# Patient Record
Sex: MALE | Race: WHITE | NOT HISPANIC OR LATINO | Employment: OTHER | URBAN - METROPOLITAN AREA
[De-identification: names, ages, dates, MRNs, and addresses within clinical notes are randomized per-mention and may not be internally consistent; named-entity substitution may affect disease eponyms.]

---

## 2023-01-01 ENCOUNTER — DOCUMENTATION ONLY (OUTPATIENT)
Dept: TRANSPLANT | Facility: CLINIC | Age: 68
End: 2023-01-01
Payer: MEDICARE

## 2023-01-01 ENCOUNTER — TRANSCRIBE ORDERS (OUTPATIENT)
Dept: OTHER | Age: 68
End: 2023-01-01

## 2023-01-01 ENCOUNTER — PRE VISIT (OUTPATIENT)
Dept: GASTROENTEROLOGY | Facility: CLINIC | Age: 68
End: 2023-01-01
Payer: MEDICARE

## 2023-01-01 ENCOUNTER — HEALTH MAINTENANCE LETTER (OUTPATIENT)
Age: 68
End: 2023-01-01

## 2023-01-01 ENCOUNTER — REFERRAL (OUTPATIENT)
Dept: TRANSPLANT | Facility: CLINIC | Age: 68
End: 2023-01-01

## 2023-01-01 ENCOUNTER — VIRTUAL VISIT (OUTPATIENT)
Dept: GASTROENTEROLOGY | Facility: CLINIC | Age: 68
End: 2023-01-01
Attending: PHYSICIAN ASSISTANT
Payer: MEDICARE

## 2023-01-01 VITALS — WEIGHT: 200 LBS | HEIGHT: 68 IN | BODY MASS INDEX: 30.31 KG/M2

## 2023-01-01 VITALS — WEIGHT: 203 LBS | HEIGHT: 68 IN | BODY MASS INDEX: 30.77 KG/M2

## 2023-01-01 DIAGNOSIS — K76.6 PORTAL HYPERTENSION (H): ICD-10-CM

## 2023-01-01 DIAGNOSIS — K75.81 LIVER CIRRHOSIS SECONDARY TO NASH (H): Primary | Chronic | ICD-10-CM

## 2023-01-01 DIAGNOSIS — K74.60 LIVER CIRRHOSIS SECONDARY TO NASH (H): Primary | ICD-10-CM

## 2023-01-01 DIAGNOSIS — K74.60 LIVER CIRRHOSIS SECONDARY TO NASH (H): Primary | Chronic | ICD-10-CM

## 2023-01-01 DIAGNOSIS — R18.8 CIRRHOSIS OF LIVER WITH ASCITES, UNSPECIFIED HEPATIC CIRRHOSIS TYPE (H): Primary | ICD-10-CM

## 2023-01-01 DIAGNOSIS — K75.81 LIVER CIRRHOSIS SECONDARY TO NASH (H): Primary | ICD-10-CM

## 2023-01-01 DIAGNOSIS — R94.39 ABNORMAL RESULT OF OTHER CARDIOVASCULAR FUNCTION STUDY: ICD-10-CM

## 2023-01-01 DIAGNOSIS — K74.60 CIRRHOSIS OF LIVER WITH ASCITES, UNSPECIFIED HEPATIC CIRRHOSIS TYPE (H): Primary | ICD-10-CM

## 2023-01-01 PROCEDURE — 99205 OFFICE O/P NEW HI 60 MIN: CPT | Mod: VID | Performed by: INTERNAL MEDICINE

## 2023-01-01 PROCEDURE — 99417 PROLNG OP E/M EACH 15 MIN: CPT | Performed by: INTERNAL MEDICINE

## 2023-01-01 RX ORDER — LACTULOSE 10 G/15ML
30 SOLUTION ORAL DAILY
COMMUNITY
Start: 2023-01-01

## 2023-01-01 RX ORDER — LEVALBUTEROL TARTRATE 45 UG/1
2 AEROSOL, METERED ORAL PRN
COMMUNITY
Start: 2023-01-01

## 2023-01-01 RX ORDER — FLUTICASONE PROPIONATE AND SALMETEROL 250; 50 UG/1; UG/1
1 POWDER RESPIRATORY (INHALATION) 2 TIMES DAILY
COMMUNITY
Start: 2023-01-01

## 2023-01-01 RX ORDER — MIDODRINE HYDROCHLORIDE 5 MG/1
5 TABLET ORAL 3 TIMES DAILY
COMMUNITY
Start: 2023-01-01

## 2023-01-01 RX ORDER — SPIRONOLACTONE 100 MG/1
150 TABLET, FILM COATED ORAL DAILY
COMMUNITY
Start: 2023-01-01

## 2023-01-01 RX ORDER — FINASTERIDE 5 MG/1
5 TABLET, FILM COATED ORAL DAILY
COMMUNITY
Start: 2023-01-01

## 2023-01-01 RX ORDER — FUROSEMIDE 80 MG
1 TABLET ORAL DAILY
COMMUNITY
Start: 2023-01-01

## 2023-01-01 RX ORDER — TAMSULOSIN HYDROCHLORIDE 0.4 MG/1
0.4 CAPSULE ORAL DAILY
COMMUNITY
Start: 2023-01-01

## 2023-01-01 RX ORDER — ATORVASTATIN CALCIUM 10 MG/1
5 TABLET, FILM COATED ORAL DAILY
COMMUNITY
Start: 2023-01-01

## 2023-01-01 ASSESSMENT — PAIN SCALES - GENERAL: PAINLEVEL: NO PAIN (0)

## 2023-05-22 NOTE — CONFIDENTIAL NOTE
DIAGNOSIS: Cirrhosis of liver with ascites, unspecified hepatic cirrhosis type (H)   Appt Date: 07.07.2023   NOTES STATUS DETAILS   OFFICE NOTE from referring provider Care Everywhere 04.27.2023 Severson, Stephanie L, PA-C [facility]    OFFICE NOTES from other specialists Care Everywhere 04.10.2023 Didier Tabares, APRN, CNP [facility]    12.27.2022 Aleksander Nix MD     DISCHARGE SUMMARY from hospital Care Everywhere 04.26.2023, 03.14.2023 [facility]   MEDICATION LIST Care Everywhere    LIVER BIOSPY (IF APPLICABLE)      PATHOLOGY REPORTS      IMAGING     ENDOSCOPY (IF AVAILABLE)     COLONOSCOPY (IF AVAILABLE) Care Everywhere 05.03.2023   ULTRASOUND LIVER Care Everywhere 05.05.2023,  04.26.2023 US Paracentesis    04.26.2023 US Hepatic Portal    CT OF ABDOMEN     MRI OF LIVER CareEverywhere 03.15.2023 MR Abd    FIBROSCAN, US ELASTOGRAPHY, FIBROSIS SCAN, MR ELASTOGRAPHY     LABS     HEPATIC PANEL (LIVER PANEL) Care Everywhere 04.28.2023   BASIC METABOLIC PANEL Care Everywhere 05.07.2023   COMPLETE METABOLIC PANEL     COMPLETE BLOOD COUNT (CBC)     INTERNATIONAL NORMALIZED RATIO (INR) Care Everywhere 05.03.2023   HEPATITIS C ANTIBODY Care Everywhere 09.23.2021   HEPATITIS C VIRAL LOAD/PCR          HEPATITIS B SURFACE ANTIGEN Care Everywhere 09.23.2021   HEPATITIS B SURFACE ANTIBODY Care Everywhere 09.23.2021   HEPATITIS B DNA QUANT LEVEL     HEPATITIS B CORE ANTIBODY Care Everywhere 09.23.2021     Action 05.22.2023 RM   Action Taken Sent fax request to [facility] for images pending.     Action 07.03.2023 RM   Action Taken Sent another fax request for images     Action 07.03.2023 RM   Action Taken Images received and uploaded to chart

## 2023-07-06 NOTE — PROGRESS NOTES
REASON FOR CONSULTATION: TEAGUE cirrhosis with decompensation  REFERRING PROVIDER: Sherron Waller NP [facility]    A/P  Claus Herrera is a 68 year old male with decompensated TEAGUE cirrhosis, c/b ascites, HRS, HE, MELD mid teens but labs ar eout of date.    He has had difficulty in the last 3 months with admissions x 2  And subseqent decline in his overall functionality. Main issue he is dealing with right now is improving strength.    Ascites has been problematic but seems to be improving, with para only monthly at this point. Last labs are from about 3-4 weeks ago and INR is from several months ago. He will be getting labs on Monday. I will review orders and add if necessary.    PVT noted (nonocclusive) in April. He is on Eliquis. Will repeat US in August and can likely stop Eliquis after that.    HypoTN when admitted 2 mo ago. On midodrine. He is monitoring BP at home. Excellent BP. Will review with him in person and plan to wean off    HRS Creat appears to have returned to normal baseline. Lab value is >3 weeks old. Will repeat next week.    Pending the results of the labs, I will plan to see him while I am in [place] in July or August.     We discussed cirrhosis, sequelae, MELD, LT at length.      She Greco MD  Hepatology/Liver Transplant  Medical Director, Liver Transplantation  [facility]    Subjective  Claus Herrera is a 68 year old male referred for decompensated TEAGUE cirrhosis. He was diagnosed with liver disease in 2021 when he had abnormal LFTs and he followed with Sherron Waller NP [facility]..    He did ok for a while, then March 2023 he was admitted with 50 pound weight gain.    Admissions   2/14-3/18/23  ascites, EGD with bands x3, JESUS creat up to 2.2  4/26-5/8/23 HRS, hypoTn, HE, new PVT started on Eliquis (CT 4/26/23 nonocclusive thrombus in main PV extending to R and L PV. MRI 3/16/23 all PV patent). Discharged to SNF    Was in SNF for about 3 weeks. Now has been  "home for about 6 weeks. He is independent in ADLs. He can climb a flight stairs without assistance. They are moving to a single level home, currently in a 3 level home. He is still getting PT and they are thinking he may no longer need it. He uses a walker in the house and a wheelchair when he is at doctor's appointments. Last time he did not need a walker was March.    HE on lactulose with 3-5 BM/d, rifaximin. No issues at home  Ascites furosemide 80 spironolactone 150. Getting para about monthly   PVT on apixaban. Will have US in August.  HypoTn On midodrine 5 mg tid. Typical BP is 120s/60-70s  EV s/p banding. Last EGD 5/3/23 with banding scars    ETOH use: none for several years.    Meds  Apixaban  Furosemide 80  Lactulose  Rifaximin  Spironolactone 150  Omeprazole  Midodrine 5 tid  Atorvastatin  Finesteride  Tamsulosin  Advair  Xopenex    Labs 6/15/23 MELD (estimate) 18  Creat 0.89 Na 138 K 3.3   AST 52 ALT 28 TB 2  Hgb 11.8 Plt 153  No INR for a few months but usually was in 1.8-2 range in the past    Past Medical History  DM diagnosed about 2018 off medications   Obesity has lost about 40-50 pounds over the last year. Now 203 lb  Asthma uses Advair bid. Rescue inhaler rare use.  BPH/Urinary retention on Proscar and Flomax. See urology regularly  HLD on statin  Social History    Last worked at [employer]. Retired age 62 because \"it was time to retire.\"  Family History  M cirrhosis.  in her 60s  3 children, 6 grandkids  ROS: 10 point ROS neg other than the symptoms noted above in the HPI.  Exam  Gen Alert pleasant NAD  Resp No difficulty breathing. No cough  Skin No Jaundice  Eyes No icterus  Neuro VIVEROS  MSK no muscle wasting  Psyche Pleasant, appropriate. Well groomed.  Claus Herrera is a 68 year old male who is being evaluated via a billable video visit.    Video-Visit Details  Type of service:  Video Visit  Video Start Time: 1040  Video End Time: 1120  Originating Location (pt. Location):in " car with wife  Distant Location (provider location):  [facility] [place]      Platform used for Video Visit: AirC[employer] or [employer]    Time today in minutes 105  Record review 35  Communication with care team 5  Face to face with patient on video 40  Documentation 25

## 2023-07-07 NOTE — NURSING NOTE
Is the patient currently in the state of [place]? YES    Visit mode:VIDEO    If the visit is dropped, the patient can be reconnected by: VIDEO VISIT: Send to e-mail at: [email or web address]    Will anyone else be joining the visit? YES: How would they like to receive their invitation? Other e-mail: [email or web address]      How would you like to obtain your AVS? Mail a copy    Are changes needed to the allergy or medication list? NO    Reason for visit: Consult

## 2023-07-07 NOTE — PROGRESS NOTES
"Virtual Visit Details    Type of service:  Video Visit   Video Start Time: {video visit start/end time for provider to select:740100}  Video End Time:{video visit start/end time for provider to select:262816}    Originating Location (pt. Location): {video visit patient location:205212::\"Home\"}  {PROVIDER LOCATION On-site should be selected for visits conducted from your clinic location or adjoining [facility] hospital, academic office, or other nearby [facility] building. Off-site should be selected for all other provider locations, including home:997342}  Distant Location (provider location):  {virtual location provider:030432}  Platform used for Video Visit: {Virtual Visit Platforms:103561::\"MyEnergy\"}  "

## 2023-08-18 PROBLEM — I85.10 SECONDARY ESOPHAGEAL VARICES WITHOUT BLEEDING (H): Chronic | Status: ACTIVE | Noted: 2022-02-16

## 2023-08-18 PROBLEM — K75.81 LIVER CIRRHOSIS SECONDARY TO NASH (H): Chronic | Status: ACTIVE | Noted: 2021-11-18

## 2023-08-18 PROBLEM — R39.12 BENIGN PROSTATIC HYPERPLASIA WITH WEAK URINARY STREAM: Chronic | Status: ACTIVE | Noted: 2023-01-01

## 2023-08-18 PROBLEM — N40.1 BENIGN PROSTATIC HYPERPLASIA WITH WEAK URINARY STREAM: Chronic | Status: ACTIVE | Noted: 2023-01-01

## 2023-08-18 PROBLEM — K74.60 LIVER CIRRHOSIS SECONDARY TO NASH (H): Chronic | Status: ACTIVE | Noted: 2021-11-18

## 2023-08-18 PROBLEM — K70.31 ASCITES DUE TO ALCOHOLIC CIRRHOSIS (H): Chronic | Status: ACTIVE | Noted: 2023-01-01

## 2023-08-18 PROBLEM — D18.03 HEPATIC HEMANGIOMA: Chronic | Status: ACTIVE | Noted: 2023-01-01

## 2023-08-18 PROBLEM — I82.0 BUDD-CHIARI SYNDROME (H): Status: ACTIVE | Noted: 2023-01-01

## 2023-08-18 PROBLEM — K26.9 DUODENAL ULCER: Status: ACTIVE | Noted: 2023-01-01

## 2023-08-18 PROBLEM — D12.6 TUBULAR ADENOMA OF COLON: Chronic | Status: ACTIVE | Noted: 2021-10-08

## 2023-08-18 NOTE — TELEPHONE ENCOUNTER
Patient was asked the following questions during liver intake call.     Referring Provider: CONSTANTINO Stapleton  Referring Diagnosis: TEAGUE  PCP:  Didier Tabares NP     1)Do you know why you have liver disease: yes             If Alcoholic Cirrhosis is present when was your last drink: 1995             Have you ever been through treatment for alcohol: on his own.  2) Presence of Ascites: yes Paracentesis: yes  3) Presence of Hepatic Encephalopathy: yes Medications: yes  4) History of GI Bleeding: no  5) Oxygen Use: no  6) EGD: yes  7) Colonoscopy: yes   8) MELD Score: 19   9) Labs available for review from PCP/GI: yes  10)HCC Diagnosis: no          Abdominal CT: yes          MRI:yes          Bone Scan: no          PET: no          Chest CT: yes          Treatment Notes w/ Images: yes          11)Insurance information:                  [facility]   Medicare     Referral intake process completed.  Patient is aware that after financial approval is received, medical records will be requested.   Patient confirmed for a callback from transplant coordinator on 8/31/23 at 11:00 AM .  Tentative evaluation date TBD    Confirmed coordinator will discuss evaluation process in more detail at the time of their call.   Patient is aware of the need to arrange age appropriate cancer screening, vaccinations, and dental care.  Reminded patient to complete questionnaire, complete medical records release, and review packet prior to evaluation visit .  Assessed patient for special needs (ie--wheelchair, assistance, guardian, and ):  wheelchair   Patient instructed to call [phone] with questions.     Patient gave verbal consent during intake call to obtain medical records and documents outside of [facility]:  yes

## 2023-08-18 NOTE — PROGRESS NOTES
8/16/23 [facility] note:    She Greco MD - 08/16/2023 1:30 PM CDT  Formatting of this note is different from the original.  [facility] LIVER TRANPLANT EVALUATION    REASON FOR CONSULTATION: TEAGUE cirrhosis with decompensation  REFERRING PROVIDER: Sherron Waller NP [facility]    A/P  Claus Herrera is a 68 year old male with decompensated TEAGUE cirrhosis, c/b ascites, HRS, HE, MELD 3.0 19 ABO A    He has had difficulty in the last 6 months with admissions x 2 And subseqent decline in his overall functionality. His MELD has been in mid-high teens. Today we reviewed his course, labs and need for LT evaluation.    Ascites Requiring LVP monthly, will move up to every 3 weeks. On furosemide 80 and spironolactone 150. No increase due to cramping, borderline labs and JEUSS in February.    PVT noted (nonocclusive) in April. He is on Eliquis. Repeat US 8/9/23 and all vessels are patent. Can stop Eliquis. Recheck in 3-4 m.    HypoTN when admitted 2 mo ago. On midodrine. He is monitoring BP at home. Excellent BP. Wean off midodrine from 5 tid, to 5 bid for 1 week, then daily for 1 week, then stop. Check BP daily and let me know if he is below 90/50 or if he has dizziness.    HCC screening US 8/7/23 no mass. Will repeat in 4 mo due to PVT.    Cramping Discussed tonic water, magnesium, zinc and vitamin E.    Dental Health Very good. Slightly overdue. Ok to proceed with routine cleaning. Discussed dental health with respect to cirrhosis    HRS Creat returned to normal baseline.    CV Echo 4/26/23 EF 70. No valvular dysfunction, No RV enlargement    Malnutrition severe. Discussed protein and MVI  Functional status Borderline, but he has improved since his last admission and is independent in ADLS.  Social support Excellent with wife Augustina her today  LT Candidacy Will refer for LT evaluation, ideally in the next 1-2 months.     Subjective  Claus Herrera is a 68 year old male referred for decompensated  TEAGUE cirrhosis. He was diagnosed with liver disease in 2021 when he had abnormal LFTs and he followed with Sherron Waller NP [facility]. MELD has been in mid-teens    He did ok for a while, then March 2023 he was admitted with 50 pound weight gain. He continues with monthly para, getting about 10-11 L off. He does not go more frequently because of scheduling availability. He will be looking at scheduling every 3 weeks. He has continued to work on getting stronger and no longer has PT    Was in SNF for about 3 weeks in [place]. Now has been home for about 3 months. He can climb a flight stairs without assistance. They just moved to a single level home midJuly. Last PT was early July. He uses a walker in the house and a wheelchair when he is at doctor's appointments. Last time he did not need a walker was March 2023. Sometimes he needs help with dressing. He performs toileting independently. He requires help with bathing. No falls.    Admissions  2/14-3/18/23 ascites, EGD with bands x3, JESUS creat up to 2.2  4/26-5/8/23 HRS, hypoTn, HE, new PVT started on Eliquis (CT 4/26/23 nonocclusive thrombus in main PV extending to R and L PV. MRI 3/16/23 all PV patent). Discharged to SNF    HE on lactulose with 3-5 BM/d, rifaximin. No issues at home  Ascites furosemide 80 spironolactone 150. Getting para about monthly with 10 L. Follows low Na diet.  PVT on apixaban.  HypoTn On midodrine 5 mg tid. Typical BP is 120s/60-70s.  EV s/p banding. Last EGD 5/3/23 with banding scars.    ETOH use: none for several years.    Meds  Apixaban  Furosemide 80  Lactulose  Rifaximin  Spironolactone 150  Omeprazole  Midodrine 5 tid  Atorvastatin  Finesteride  Tamsulosin  Advair  Xopenex    Labs 8/16/23 MELD 3.0 19  Creat 1.06 Na 134 K 3.8   AST 64 ALT 28 TB 1.7 Albumin 2.4  Hgb 11.2 Plt 154  INR 1.9    MELD-Na score: 19 at 8/16/2023 1:07 PM  MELD score: 17 at 8/16/2023 1:07 PM  Calculated from:  Serum Creatinine: 1.06 mg/dL at 8/16/2023  "1:07 PM  Serum Sodium: 134 mEq/L at 2023 1:07 PM  Total Bilirubin: 1.7 mg/dL at 2023 1:07 PM  INR(ratio): 1.9 at 2023 1:07 PM  Age: 68 years    Labs 6/15/23 MELD (estimate) 18  Creat 0.89 Na 138 K 3.3   AST 52 ALT 28 TB 2  Hgb 11.8 Plt 153  No INR for a few months but usually was in 1.8-2 range in the past    Past Medical History  DM diagnosed about 2018 off medications  Obesity has lost about 40-50 pounds over the last year. Now 203 lb  Asthma uses Advair bid. Rescue inhaler rare use.  BPH/Urinary retention on Proscar and Flomax. See urology regularly  HLD on statin  Social History    Last worked at [employer]. Retired age 62 because \"it was time to retire.\"  Family History  M cirrhosis.  in her 60s  3 children, 6 grandkids  ROS: 10 point ROS neg other than the symptoms noted above in the HPI.  Exam  Vitals:  23 1322  Height: 5' 7\" (1.702 m)  Weight: 219 lb (99.3 kg)  BMI (Calculated): 34.3    Gen Alert pleasant NAD  Resp No difficulty breathing. No cough  Skin No Jaundice  Eyes No icterus  Neuro VIVEROS  MSK no muscle wasting  Psyche Pleasant, appropriate. Well groomed.    Electronically signed by She Greco MD at 2023 7:44 PM CDT           23 [facility] clinic note:    REASON FOR CONSULTATION: TEAGUE cirrhosis with decompensation  REFERRING PROVIDER: [facility]     A/P  Claus FRANCISCO Herrera is a 68 year old male with decompensated TEAGUE cirrhosis, c/b ascites, HRS, HE, MELD mid teens but labs ar eout of date.     He has had difficulty in the last 3 months with admissions x 2  And subseqent decline in his overall functionality. Main issue he is dealing with right now is improving strength.     Ascites has been problematic but seems to be improving, with para only monthly at this point. Last labs are from about 3-4 weeks ago and INR is from several months ago. He will be getting labs on Monday. I will review orders and add if necessary.     PVT noted " (nonocclusive) in April. He is on Eliquis. Will repeat US in August and can likely stop Eliquis after that.     HypoTN when admitted 2 mo ago. On midodrine. He is monitoring BP at home. Excellent BP. Will review with him in person and plan to wean off     HRS Creat appears to have returned to normal baseline. Lab value is >3 weeks old. Will repeat next week.     Pending the results of the labs, I will plan to see him while I am in [place] in July or August.      We discussed cirrhosis, sequelae, MELD, LT at length.        She Greco MD  Hepatology/Liver Transplant  Medical Director, Liver Transplantation  [facility]    Subjective  Claus Herrera is a 68 year old male referred for decompensated TEAGUE cirrhosis. He was diagnosed with liver disease in 2021 when he had abnormal LFTs and he followed with Sherron Waller NP [facility]     He did ok for a while, then March 2023 he was admitted with 50 pound weight gain.     Admissions   2/14-3/18/23  ascites, EGD with bands x3, JESUS creat up to 2.2  4/26-5/8/23 HRS, hypoTn, HE, new PVT started on Eliquis (CT 4/26/23 nonocclusive thrombus in main PV extending to R and L PV. MRI 3/16/23 all PV patent). Discharged to SNF     Was in SNF for about 3 weeks. Now has been home for about 6 weeks. He is independent in ADLs. He can climb a flight stairs without assistance. They are moving to a single level home, currently in a 3 level home. He is still getting PT and they are thinking he may no longer need it. He uses a walker in the house and a wheelchair when he is at doctor's appointments. Last time he did not need a walker was March.     HE on lactulose with 3-5 BM/d, rifaximin. No issues at home  Ascites furosemide 80 spironolactone 150. Getting para about monthly   PVT on apixaban. Will have US in August.  HypoTn On midodrine 5 mg tid. Typical BP is 120s/60-70s  EV s/p banding. Last EGD 5/3/23 with banding scars     ETOH use: none for several years.    "  Meds  Apixaban  Furosemide 80  Lactulose  Rifaximin  Spironolactone 150  Omeprazole  Midodrine 5 tid  Atorvastatin  Finesteride  Tamsulosin  Advair  Xopenex     Labs 6/15/23 MELD (estimate) 18  Creat 0.89 Na 138 K 3.3   AST 52 ALT 28 TB 2  Hgb 11.8 Plt 153  No INR for a few months but usually was in 1.8-2 range in the past     Past Medical History  DM diagnosed about  off medications   Obesity has lost about 40-50 pounds over the last year. Now 203 lb  Asthma uses Advair bid. Rescue inhaler rare use.  BPH/Urinary retention on Proscar and Flomax. See urology regularly  HLD on statin  Social History    Last worked at [employer]. Retired age 62 because \"it was time to retire.\"  Family History  M cirrhosis.  in her 60s  3 children, 6 grandkids  ROS: 10 point ROS neg other than the symptoms noted above in the HPI.  Exam  Gen Alert pleasant NAD  Resp No difficulty breathing. No cough  Skin No Jaundice  Eyes No icterus  Neuro VIVEROS  MSK no muscle wasting  Psyche Pleasant, appropriate. Well groomed.  Claus Herrera is a 68 year old male who is being evaluated via a billable video visit.    Video-Visit Details  Type of service:  Video Visit  Video Start Time: 1040  Video End Time: 1120  Originating Location (pt. Location):in car with wife  Distant Location (provider location):  [facility] [place]      "

## 2023-08-18 NOTE — LETTER
August 18, 2023    Claus Herrera  [address] [facility] [place] [address]    Dear Claus,    Thank you for your interest in the Transplant Center at [facility]. We look forward to being a part of your care team and assisting you through the transplant process.    As we discussed, your transplant coordinator is Sami Lopez Jr. (Liver).  You may call your coordinator at any time with questions or concerns.  Your first scheduled call will be on 8/31/23 at 11:00 AM.  If this needs to change, call [phone].    Please complete the following.    Fill out and return the enclosed forms  Authorization for Electronic Communication  Authorization for Release of Protected Health Information    Sign up for:  [employer], access to your electronic medical record (see enclosed pamphlet)  Hipscan, a transplant education website  Pre-Liver Transplant (English): [email or web address].CloudPay.net/playlist?list=[email or web address]   Transplant Medication Training (English): https://www.Workshare.CloudPay.net/playlist?list=[email or web address]-[email or web address]  Transplant Admission Training (English): https://www.Workshare.CloudPay.net/[place] Interactivelist?list=[email or web address]  Post-Transplant Complications (All Organs) (English): https://www.Workshare.CloudPay.net/playlist?list=[email or web address]  Post-Transplant General Health (All Organs) (English): https://www.Workshare.CloudPay.net/playlist?list=[email or web address]  General Patient Learning Modules (Enoxaparin, Warfarin, Olivares) (English): https://www.Workshare.CloudPay.net/[place] Interactivelist?list=[email or web address]      You can use these tools to learn more about your transplant, communicate with your care team, and track your medical details      Sincerely,      Solid Organ Transplant  [facility]    cc: Referring Physician PCP

## 2023-08-31 NOTE — TELEPHONE ENCOUNTER
"Referring Provider: CONSTANTINO Stapleton  Referring Diagnosis: TEAGUE  PCP:  Didier Tabares, JONATHAN     8/16/23 visit with Dr. She Greco in [place] at [facility], note below:    \"A/P  Claus Herrera is a 68 year old male with decompensated TEAGUE cirrhosis, c/b ascites, HRS, HE, MELD 3.0 19 ABO A    He has had difficulty in the last 6 months with admissions x 2 And subseqent decline in his overall functionality. His MELD has been in mid-high teens. Today we reviewed his course, labs and need for LT evaluation.    Ascites Requiring LVP monthly, will move up to every 3 weeks. On furosemide 80 and spironolactone 150. No increase due to cramping, borderline labs and JESUS in February.     PVT noted (nonocclusive) in April. He is on Eliquis. Repeat US 8/9/23 and all vessels are patent. Can stop Eliquis. Recheck in 3-4 m.    HypoTN when admitted 2 mo ago. On midodrine. He is monitoring BP at home. Excellent BP. Wean off midodrine from 5 tid, to 5 bid for 1 week, then daily for 1 week, then stop. Check BP daily and let me know if he is below 90/50 or if he has dizziness.     HCC screening US 8/7/23 no mass. Will repeat in 4 mo due to PVT.    Cramping Discussed tonic water, magnesium, zinc and vitamin E.  Dental Health Very good. Slightly overdue. Ok to proceed with routine cleaning. 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

## 2023-09-14 NOTE — TELEPHONE ENCOUNTER
"Just spoke with patient who wants more time to think on if he wants to proceed or not proceed with transplant.    Patient wants time to think about:  - pain and recovery it would take  - issues related to having to \"get sicker\" before meld high enough to get transplant (MELD 19 / A)  - grappling with idea of having another person's organ in his body  - worried about support, has a great wife, but she is pretty much his only care giver as their kids are busy with their own medical issues and families.     Plan:   - cancel eval appt next week (9/19) at [facility].  - they will call [facility] and get on Dr. Greco's yani there for follow up in October or November to discuss further.    They have my direct phone number and use mychart - mentioned mutliple times to reach out if there are questions they feel I can help with.       "

## 2023-11-02 NOTE — TELEPHONE ENCOUNTER
See Dr. Greco note from seeing patient at [facility] on 10/12/23    Patient choice to NOT proceed with transplant.     Closing referral at this time.